# Patient Record
Sex: FEMALE | Race: WHITE | NOT HISPANIC OR LATINO | URBAN - METROPOLITAN AREA
[De-identification: names, ages, dates, MRNs, and addresses within clinical notes are randomized per-mention and may not be internally consistent; named-entity substitution may affect disease eponyms.]

---

## 2017-01-23 ENCOUNTER — OUTPATIENT (OUTPATIENT)
Dept: OUTPATIENT SERVICES | Facility: HOSPITAL | Age: 32
LOS: 1 days | End: 2017-01-23
Payer: MEDICAID

## 2017-01-23 VITALS
TEMPERATURE: 98 F | RESPIRATION RATE: 16 BRPM | SYSTOLIC BLOOD PRESSURE: 123 MMHG | WEIGHT: 177.91 LBS | HEIGHT: 60 IN | DIASTOLIC BLOOD PRESSURE: 71 MMHG

## 2017-01-23 DIAGNOSIS — E28.2 POLYCYSTIC OVARIAN SYNDROME: ICD-10-CM

## 2017-01-23 DIAGNOSIS — N80.0 ENDOMETRIOSIS OF UTERUS: ICD-10-CM

## 2017-01-23 DIAGNOSIS — N92.1 EXCESSIVE AND FREQUENT MENSTRUATION WITH IRREGULAR CYCLE: ICD-10-CM

## 2017-01-23 DIAGNOSIS — Z01.818 ENCOUNTER FOR OTHER PREPROCEDURAL EXAMINATION: ICD-10-CM

## 2017-01-23 DIAGNOSIS — Z98.51 TUBAL LIGATION STATUS: Chronic | ICD-10-CM

## 2017-01-23 LAB
ALBUMIN SERPL ELPH-MCNC: 3.5 G/DL — SIGNIFICANT CHANGE UP (ref 3.3–5)
ALP SERPL-CCNC: 71 U/L — SIGNIFICANT CHANGE UP (ref 40–120)
ALT FLD-CCNC: 85 U/L — HIGH (ref 12–78)
ANION GAP SERPL CALC-SCNC: 12 MMOL/L — SIGNIFICANT CHANGE UP (ref 5–17)
AST SERPL-CCNC: 66 U/L — HIGH (ref 15–37)
BILIRUB SERPL-MCNC: 0.3 MG/DL — SIGNIFICANT CHANGE UP (ref 0.2–1.2)
BUN SERPL-MCNC: 7 MG/DL — SIGNIFICANT CHANGE UP (ref 7–23)
CALCIUM SERPL-MCNC: 9.3 MG/DL — SIGNIFICANT CHANGE UP (ref 8.5–10.1)
CHLORIDE SERPL-SCNC: 103 MMOL/L — SIGNIFICANT CHANGE UP (ref 96–108)
CO2 SERPL-SCNC: 25 MMOL/L — SIGNIFICANT CHANGE UP (ref 22–31)
CREAT SERPL-MCNC: 0.64 MG/DL — SIGNIFICANT CHANGE UP (ref 0.5–1.3)
DIR ANTIGLOB POLYSPECIFIC INTERPRETATION: SIGNIFICANT CHANGE UP
GLUCOSE SERPL-MCNC: 90 MG/DL — SIGNIFICANT CHANGE UP (ref 70–99)
HBA1C BLD-MCNC: 5.7 % — HIGH (ref 4–5.6)
HCG SERPL-ACNC: <1 MIU/ML — SIGNIFICANT CHANGE UP
HCT VFR BLD CALC: 35.8 % — SIGNIFICANT CHANGE UP (ref 34.5–45)
HGB BLD-MCNC: 11.2 G/DL — LOW (ref 11.5–15.5)
MCHC RBC-ENTMCNC: 24.1 PG — LOW (ref 27–34)
MCHC RBC-ENTMCNC: 31.2 GM/DL — LOW (ref 32–36)
MCV RBC AUTO: 77.3 FL — LOW (ref 80–100)
PLATELET # BLD AUTO: 209 K/UL — SIGNIFICANT CHANGE UP (ref 150–400)
POTASSIUM SERPL-MCNC: 3.5 MMOL/L — SIGNIFICANT CHANGE UP (ref 3.5–5.3)
POTASSIUM SERPL-SCNC: 3.5 MMOL/L — SIGNIFICANT CHANGE UP (ref 3.5–5.3)
PROT SERPL-MCNC: 8.2 G/DL — SIGNIFICANT CHANGE UP (ref 6–8.3)
RBC # BLD: 4.63 M/UL — SIGNIFICANT CHANGE UP (ref 3.8–5.2)
RBC # FLD: 15.7 % — HIGH (ref 10.3–14.5)
SODIUM SERPL-SCNC: 140 MMOL/L — SIGNIFICANT CHANGE UP (ref 135–145)
WBC # BLD: 5.3 K/UL — SIGNIFICANT CHANGE UP (ref 3.8–10.5)
WBC # FLD AUTO: 5.3 K/UL — SIGNIFICANT CHANGE UP (ref 3.8–10.5)

## 2017-01-23 NOTE — H&P PST ADULT - HISTORY OF PRESENT ILLNESS
30 yo female scheduled for Suction Dilation & Curettage Hysteroscopy W/Novasure Endometrial Ablation with Dr Harrison Boone on 1/27/17.  Patient states she has had heavy continuous vaginal bleeding and cramping  since Thanksgiving.

## 2017-01-23 NOTE — H&P PST ADULT - ASSESSMENT
30 yo female scheduled for Suction Dilation & Curettage Hysteroscopy W/Novasure Endometrial Ablation with Dr Harrison Boone on 1/27/17.

## 2017-01-23 NOTE — H&P PST ADULT - PMH
Anxiety    Asthma  last episode 2 years ago, was hospitalized as a child  Endometriosis of uterus    Excessive and frequent menstruation with irregular cycle    Gestational diabetes    Polycystic ovarian syndrome

## 2017-01-23 NOTE — H&P PST ADULT - FAMILY HISTORY
Grandparent  Still living? Yes, Estimated age: 73  Family history of type 2 diabetes mellitus, Age at diagnosis: Age Unknown  Hypertension, Age at diagnosis: Age Unknown

## 2017-01-23 NOTE — H&P PST ADULT - NSANTHOSAYNRD_GEN_A_CORE
No. DANISHA screening performed.  STOP BANG Legend: 0-2 = LOW Risk; 3-4 = INTERMEDIATE Risk; 5-8 = HIGH Risk

## 2017-01-23 NOTE — H&P PST ADULT - PROBLEM SELECTOR PLAN 2
32 yo female scheduled for Suction Dilation & Curettage Hysteroscopy W/Novasure Endometrial Ablation with Dr Harrison Boone on 1/27/17.   Check labs CBC CMP Type and Screen HCG  No medical clearance  Hold evening dose of Metformin 1/26/17  Preop instructions were given  Patient verbalizes understanding of instructions

## 2017-01-26 PROCEDURE — 86880 COOMBS TEST DIRECT: CPT

## 2017-01-26 PROCEDURE — 86905 BLOOD TYPING RBC ANTIGENS: CPT

## 2017-01-26 PROCEDURE — 80053 COMPREHEN METABOLIC PANEL: CPT

## 2017-01-26 PROCEDURE — G0463: CPT

## 2017-01-26 PROCEDURE — 83036 HEMOGLOBIN GLYCOSYLATED A1C: CPT

## 2017-01-26 PROCEDURE — 85027 COMPLETE CBC AUTOMATED: CPT

## 2017-01-26 PROCEDURE — 86900 BLOOD TYPING SEROLOGIC ABO: CPT

## 2017-01-26 PROCEDURE — 86870 RBC ANTIBODY IDENTIFICATION: CPT

## 2017-01-26 PROCEDURE — 84702 CHORIONIC GONADOTROPIN TEST: CPT

## 2017-01-26 PROCEDURE — 86902 BLOOD TYPE ANTIGEN DONOR EA: CPT

## 2017-01-26 PROCEDURE — 86901 BLOOD TYPING SEROLOGIC RH(D): CPT

## 2017-01-26 PROCEDURE — 86850 RBC ANTIBODY SCREEN: CPT

## 2017-02-21 ENCOUNTER — OTHER (OUTPATIENT)
Age: 32
End: 2017-02-21

## 2017-02-21 ENCOUNTER — APPOINTMENT (OUTPATIENT)
Dept: GASTROENTEROLOGY | Facility: CLINIC | Age: 32
End: 2017-02-21

## 2017-02-21 VITALS
HEIGHT: 60 IN | SYSTOLIC BLOOD PRESSURE: 110 MMHG | WEIGHT: 175 LBS | DIASTOLIC BLOOD PRESSURE: 80 MMHG | BODY MASS INDEX: 34.36 KG/M2 | TEMPERATURE: 98.3 F

## 2017-02-21 DIAGNOSIS — N92.6 IRREGULAR MENSTRUATION, UNSPECIFIED: ICD-10-CM

## 2017-02-21 DIAGNOSIS — Z78.9 OTHER SPECIFIED HEALTH STATUS: ICD-10-CM

## 2017-02-21 DIAGNOSIS — K59.00 CONSTIPATION, UNSPECIFIED: ICD-10-CM

## 2017-02-21 DIAGNOSIS — E03.9 HYPOTHYROIDISM, UNSPECIFIED: ICD-10-CM

## 2017-02-21 DIAGNOSIS — D72.819 DECREASED WHITE BLOOD CELL COUNT, UNSPECIFIED: ICD-10-CM

## 2017-02-21 DIAGNOSIS — R74.0 NONSPECIFIC ELEVATION OF LEVELS OF TRANSAMINASE AND LACTIC ACID DEHYDROGENASE [LDH]: ICD-10-CM

## 2017-02-21 PROBLEM — Z00.00 ENCOUNTER FOR PREVENTIVE HEALTH EXAMINATION: Noted: 2017-02-21

## 2017-03-21 ENCOUNTER — APPOINTMENT (OUTPATIENT)
Dept: GASTROENTEROLOGY | Facility: CLINIC | Age: 32
End: 2017-03-21

## 2017-03-26 PROBLEM — J45.909 UNSPECIFIED ASTHMA, UNCOMPLICATED: Chronic | Status: ACTIVE | Noted: 2017-01-23

## 2017-03-26 PROBLEM — N92.1 EXCESSIVE AND FREQUENT MENSTRUATION WITH IRREGULAR CYCLE: Chronic | Status: ACTIVE | Noted: 2017-01-23

## 2017-03-26 PROBLEM — N80.0 ENDOMETRIOSIS OF UTERUS: Chronic | Status: ACTIVE | Noted: 2017-01-23

## 2017-03-26 PROBLEM — O24.419 GESTATIONAL DIABETES MELLITUS IN PREGNANCY, UNSPECIFIED CONTROL: Chronic | Status: ACTIVE | Noted: 2017-01-23

## 2017-03-28 ENCOUNTER — APPOINTMENT (OUTPATIENT)
Dept: GASTROENTEROLOGY | Facility: CLINIC | Age: 32
End: 2017-03-28

## 2017-03-28 VITALS
BODY MASS INDEX: 34.36 KG/M2 | RESPIRATION RATE: 16 BRPM | DIASTOLIC BLOOD PRESSURE: 80 MMHG | TEMPERATURE: 99.1 F | HEIGHT: 60 IN | WEIGHT: 175 LBS | SYSTOLIC BLOOD PRESSURE: 125 MMHG

## 2017-03-28 DIAGNOSIS — K58.1 IRRITABLE BOWEL SYNDROME WITH CONSTIPATION: ICD-10-CM

## 2017-03-28 DIAGNOSIS — Z09 ENCOUNTER FOR FOLLOW-UP EXAMINATION AFTER COMPLETED TREATMENT FOR CONDITIONS OTHER THAN MALIGNANT NEOPLASM: ICD-10-CM

## 2017-03-28 DIAGNOSIS — E66.9 OBESITY, UNSPECIFIED: ICD-10-CM

## 2017-03-28 DIAGNOSIS — Z87.42 PERSONAL HISTORY OF OTHER DISEASES OF THE FEMALE GENITAL TRACT: ICD-10-CM

## 2017-03-28 DIAGNOSIS — Z71.89 OTHER SPECIFIED COUNSELING: ICD-10-CM

## 2017-03-28 DIAGNOSIS — Z71.9 COUNSELING, UNSPECIFIED: ICD-10-CM

## 2017-03-28 DIAGNOSIS — K76.0 FATTY (CHANGE OF) LIVER, NOT ELSEWHERE CLASSIFIED: ICD-10-CM

## 2017-03-28 DIAGNOSIS — K21.9 GASTRO-ESOPHAGEAL REFLUX DISEASE W/OUT ESOPHAGITIS: ICD-10-CM

## 2017-03-28 DIAGNOSIS — D50.9 IRON DEFICIENCY ANEMIA, UNSPECIFIED: ICD-10-CM

## 2017-03-28 RX ORDER — CHLORHEXIDINE GLUCONATE 4 %
5 LIQUID (ML) TOPICAL
Refills: 0 | Status: ACTIVE | COMMUNITY

## 2017-03-28 RX ORDER — LACTULOSE 20 G/20G
20 POWDER, FOR SOLUTION ORAL DAILY
Qty: 30 | Refills: 3 | Status: DISCONTINUED | COMMUNITY
Start: 2017-02-21 | End: 2017-03-28

## 2017-03-28 RX ORDER — METFORMIN HYDROCHLORIDE 500 MG/1
500 TABLET, COATED ORAL
Refills: 0 | Status: ACTIVE | COMMUNITY

## 2017-03-28 RX ORDER — LACTULOSE 10 G/15ML
20 SOLUTION ORAL
Qty: 2000 | Refills: 2 | Status: ACTIVE | COMMUNITY
Start: 2017-02-21 | End: 1900-01-01

## 2017-03-28 RX ORDER — IRON POLYSACCHARIDE COMPLEX 150 MG
150 CAPSULE ORAL
Qty: 30 | Refills: 2 | Status: ACTIVE | COMMUNITY
Start: 2017-02-21

## 2017-03-28 RX ORDER — LEVOTHYROXINE SODIUM 25 UG/1
25 TABLET ORAL
Refills: 0 | Status: ACTIVE | COMMUNITY

## 2017-05-05 ENCOUNTER — OUTPATIENT (OUTPATIENT)
Dept: OUTPATIENT SERVICES | Facility: HOSPITAL | Age: 32
LOS: 1 days | End: 2017-05-05
Payer: MEDICAID

## 2017-05-05 VITALS
HEART RATE: 80 BPM | SYSTOLIC BLOOD PRESSURE: 104 MMHG | TEMPERATURE: 98 F | DIASTOLIC BLOOD PRESSURE: 53 MMHG | WEIGHT: 179.9 LBS | HEIGHT: 60 IN | RESPIRATION RATE: 16 BRPM

## 2017-05-05 DIAGNOSIS — N80.0 ENDOMETRIOSIS OF UTERUS: ICD-10-CM

## 2017-05-05 DIAGNOSIS — N92.1 EXCESSIVE AND FREQUENT MENSTRUATION WITH IRREGULAR CYCLE: ICD-10-CM

## 2017-05-05 DIAGNOSIS — Z01.818 ENCOUNTER FOR OTHER PREPROCEDURAL EXAMINATION: ICD-10-CM

## 2017-05-05 DIAGNOSIS — Z98.51 TUBAL LIGATION STATUS: Chronic | ICD-10-CM

## 2017-05-05 DIAGNOSIS — E28.1 ANDROGEN EXCESS: ICD-10-CM

## 2017-05-05 LAB — HCG SERPL-ACNC: <1 MIU/ML — SIGNIFICANT CHANGE UP

## 2017-05-05 NOTE — H&P PST ADULT - PROBLEM SELECTOR PLAN 4
Labs- CBC, CMP, PT/PTT , UA, C/S, HCG and EKG on chart from PCP office.   T&S and HCG pending   Seen and cleared by NP. CHRIST on chart.   Pre op instructions reviewed and given. Instructed to take her Synthroid am of surgery with sip of water. Hold Supplements. Continue with Iron. Hold Metformin night before and am of surgery.

## 2017-05-05 NOTE — H&P PST ADULT - PMH
Anxiety    Asthma  last episode 2 years ago, was hospitalized as a child  Elevated LFTs  Resolving; Was dx with a fatty liver after liver ultrasound  Endometriosis of uterus    Excessive and frequent menstruation with irregular cycle    Gestational diabetes    Irritable bowel syndrome with constipation    Polycystic ovarian syndrome

## 2017-05-05 NOTE — H&P PST ADULT - ASSESSMENT
32 yo female scheduled for Suction Dilation & Curettage Hysteroscopy W/Novasure Endometrial Ablation with Dr Harrison Boone on 1/27/17.

## 2017-05-05 NOTE — H&P PST ADULT - HISTORY OF PRESENT ILLNESS
30 yo female scheduled for Suction Dilation & Curettage Hysteroscopy W/Novasure Endometrial Ablation with Dr Harrison Boone on 1/27/2017. Patient states she has had heavy continuous vaginal bleeding and cramping  since Thanksgiving.      ADD- Previously cancelled secondary to elevated LFTs. Needed MC. After a full work -up, was diagnosed with a fatty liver and hypothyroidism. Seen and cleared by her PCP. Continues to report irregular vaginal bleeding with heavy flow. Denies pain today. H/O anemia. On Iron daily.

## 2017-05-11 PROCEDURE — 84702 CHORIONIC GONADOTROPIN TEST: CPT

## 2017-05-11 PROCEDURE — 86850 RBC ANTIBODY SCREEN: CPT

## 2017-05-11 PROCEDURE — 86870 RBC ANTIBODY IDENTIFICATION: CPT

## 2017-05-11 PROCEDURE — G0463: CPT

## 2017-05-11 PROCEDURE — 86900 BLOOD TYPING SEROLOGIC ABO: CPT

## 2017-05-11 PROCEDURE — 86902 BLOOD TYPE ANTIGEN DONOR EA: CPT

## 2017-05-11 PROCEDURE — 86901 BLOOD TYPING SEROLOGIC RH(D): CPT

## 2017-05-11 RX ORDER — ACETAMINOPHEN 500 MG
650 TABLET ORAL ONCE
Qty: 0 | Refills: 0 | Status: COMPLETED | OUTPATIENT
Start: 2017-05-12 | End: 2017-05-12

## 2017-05-11 RX ORDER — SODIUM CHLORIDE 9 MG/ML
1000 INJECTION, SOLUTION INTRAVENOUS
Qty: 0 | Refills: 0 | Status: DISCONTINUED | OUTPATIENT
Start: 2017-05-12 | End: 2017-05-12

## 2017-05-12 ENCOUNTER — RESULT REVIEW (OUTPATIENT)
Age: 32
End: 2017-05-12

## 2017-05-12 ENCOUNTER — TRANSCRIPTION ENCOUNTER (OUTPATIENT)
Age: 32
End: 2017-05-12

## 2017-05-12 ENCOUNTER — OUTPATIENT (OUTPATIENT)
Dept: OUTPATIENT SERVICES | Facility: HOSPITAL | Age: 32
LOS: 1 days | Discharge: HOME | End: 2017-05-12
Payer: MEDICAID

## 2017-05-12 VITALS
OXYGEN SATURATION: 100 % | TEMPERATURE: 98 F | RESPIRATION RATE: 13 BRPM | HEART RATE: 64 BPM | SYSTOLIC BLOOD PRESSURE: 124 MMHG | WEIGHT: 179.9 LBS | HEIGHT: 59 IN | DIASTOLIC BLOOD PRESSURE: 69 MMHG

## 2017-05-12 VITALS
DIASTOLIC BLOOD PRESSURE: 70 MMHG | OXYGEN SATURATION: 98 % | RESPIRATION RATE: 12 BRPM | HEART RATE: 68 BPM | SYSTOLIC BLOOD PRESSURE: 120 MMHG

## 2017-05-12 DIAGNOSIS — N80.0 ENDOMETRIOSIS OF UTERUS: ICD-10-CM

## 2017-05-12 DIAGNOSIS — E28.2 POLYCYSTIC OVARIAN SYNDROME: ICD-10-CM

## 2017-05-12 DIAGNOSIS — Z98.51 TUBAL LIGATION STATUS: Chronic | ICD-10-CM

## 2017-05-12 DIAGNOSIS — N92.1 EXCESSIVE AND FREQUENT MENSTRUATION WITH IRREGULAR CYCLE: ICD-10-CM

## 2017-05-12 PROCEDURE — 86922 COMPATIBILITY TEST ANTIGLOB: CPT

## 2017-05-12 PROCEDURE — 88305 TISSUE EXAM BY PATHOLOGIST: CPT

## 2017-05-12 PROCEDURE — 88305 TISSUE EXAM BY PATHOLOGIST: CPT | Mod: 26

## 2017-05-12 PROCEDURE — 86900 BLOOD TYPING SEROLOGIC ABO: CPT

## 2017-05-12 PROCEDURE — 58563 HYSTEROSCOPY ABLATION: CPT

## 2017-05-12 PROCEDURE — 86850 RBC ANTIBODY SCREEN: CPT

## 2017-05-12 PROCEDURE — 86901 BLOOD TYPING SEROLOGIC RH(D): CPT

## 2017-05-12 RX ORDER — LEVOTHYROXINE SODIUM 125 MCG
1 TABLET ORAL
Qty: 0 | Refills: 0 | COMMUNITY

## 2017-05-12 RX ORDER — LACTULOSE 10 G/15ML
0 SOLUTION ORAL
Qty: 0 | Refills: 0 | COMMUNITY

## 2017-05-12 RX ORDER — FERROUS SULFATE 325(65) MG
0 TABLET ORAL
Qty: 0 | Refills: 0 | COMMUNITY

## 2017-05-12 RX ORDER — OXYCODONE HYDROCHLORIDE 5 MG/1
10 TABLET ORAL EVERY 6 HOURS
Qty: 0 | Refills: 0 | Status: DISCONTINUED | OUTPATIENT
Start: 2017-05-12 | End: 2017-05-12

## 2017-05-12 RX ORDER — IBUPROFEN 200 MG
1 TABLET ORAL
Qty: 0 | Refills: 0 | COMMUNITY

## 2017-05-12 RX ORDER — HYDROMORPHONE HYDROCHLORIDE 2 MG/ML
0.5 INJECTION INTRAMUSCULAR; INTRAVENOUS; SUBCUTANEOUS
Qty: 0 | Refills: 0 | Status: DISCONTINUED | OUTPATIENT
Start: 2017-05-12 | End: 2017-05-12

## 2017-05-12 RX ORDER — METFORMIN HYDROCHLORIDE 850 MG/1
1 TABLET ORAL
Qty: 0 | Refills: 0 | COMMUNITY

## 2017-05-12 RX ORDER — SODIUM CHLORIDE 9 MG/ML
1000 INJECTION, SOLUTION INTRAVENOUS
Qty: 0 | Refills: 0 | Status: DISCONTINUED | OUTPATIENT
Start: 2017-05-12 | End: 2017-05-12

## 2017-05-12 RX ORDER — OXYCODONE HYDROCHLORIDE 5 MG/1
5 TABLET ORAL EVERY 4 HOURS
Qty: 0 | Refills: 0 | Status: DISCONTINUED | OUTPATIENT
Start: 2017-05-12 | End: 2017-05-12

## 2017-05-12 RX ORDER — ACETAMINOPHEN 500 MG
2 TABLET ORAL
Qty: 0 | Refills: 0 | COMMUNITY

## 2017-05-12 RX ORDER — FEXOFENADINE HCL 30 MG
0 TABLET ORAL
Qty: 0 | Refills: 0 | COMMUNITY

## 2017-05-12 RX ORDER — IPRATROPIUM/ALBUTEROL SULFATE 18-103MCG
0 AEROSOL WITH ADAPTER (GRAM) INHALATION
Qty: 0 | Refills: 0 | COMMUNITY

## 2017-05-12 RX ADMIN — Medication 650 MILLIGRAM(S): at 08:25

## 2017-05-12 RX ADMIN — SODIUM CHLORIDE 50 MILLILITER(S): 9 INJECTION, SOLUTION INTRAVENOUS at 08:23

## 2017-05-12 RX ADMIN — SODIUM CHLORIDE 80 MILLILITER(S): 9 INJECTION, SOLUTION INTRAVENOUS at 11:11

## 2017-05-12 RX ADMIN — HYDROMORPHONE HYDROCHLORIDE 0.5 MILLIGRAM(S): 2 INJECTION INTRAMUSCULAR; INTRAVENOUS; SUBCUTANEOUS at 11:11

## 2017-05-12 NOTE — ASU DISCHARGE PLAN (ADULT/PEDIATRIC). - MEDICATION SUMMARY - MEDICATIONS TO TAKE
I will START or STAY ON the medications listed below when I get home from the hospital:    Motrin 800 mg oral tablet  -- 1 tab(s) by mouth 3 times a day, As Needed  -- Indication: For Pain med    Tylenol 500 mg oral tablet  -- 2 tab(s) by mouth every 8 hours, As Needed  -- Indication: For Pain med    metFORMIN 500 mg oral tablet  -- 1 tab(s) by mouth 2 times a day  -- Indication: For home med    Allegra  --  by mouth , As Needed  -- Indication: For home med    albuterol-ipratropium  --  inhaled , As Needed  -- Indication: For home med    ferrous sulfate  --  by mouth once a day  -- Indication: For home med    lactulose  --  by mouth once a day, As Needed  -- Indication: For home med    Synthroid 25 mcg (0.025 mg) oral tablet  -- 1 tab(s) by mouth once a day  -- Indication: For home med

## 2017-05-12 NOTE — ASU DISCHARGE PLAN (ADULT/PEDIATRIC). - ACTIVITY LEVEL
no sports/gym/no intercourse/no exercise/no weight bearing/no douching/no tampons/no heavy lifting/nothing per vagina/no tub baths

## 2017-05-12 NOTE — BRIEF OPERATIVE NOTE - PROCEDURE
Hysteroscopy using resectoscope with radiofrequency ablation (RFA) of endometrium using NovaSure system  05/12/2017  D&C  Active  AYAD

## 2017-05-21 DIAGNOSIS — E28.2 POLYCYSTIC OVARIAN SYNDROME: ICD-10-CM

## 2017-05-21 DIAGNOSIS — J45.909 UNSPECIFIED ASTHMA, UNCOMPLICATED: ICD-10-CM

## 2017-05-21 DIAGNOSIS — N92.1 EXCESSIVE AND FREQUENT MENSTRUATION WITH IRREGULAR CYCLE: ICD-10-CM

## 2017-05-21 DIAGNOSIS — N84.0 POLYP OF CORPUS UTERI: ICD-10-CM

## 2019-09-02 PROBLEM — Z09 FOLLOW UP: Status: ACTIVE | Noted: 2017-03-28

## 2021-07-07 NOTE — H&P PST ADULT - WEIGHT IN LBS
[facility]  Progress Note - Rajesh Jernigan 57 1995, 27 y o  female MRN: [id number]  Unit/Bed#: [facility] 605-01 Encounter: [id number]  Primary Care Provider: Jossy Tejada DO   Date and time admitted to hospital: 7/6/2021 12:37 AM    Focal motor deficit  Assessment & Plan  Bilateral lower extremity weakness s/p fall down stairs/seizure  · Witnessed seizure and fall down a flight of steps  · On presentation complaining of bilateral LE numbness  · On Depakote maintenance  Imaging:  · MRI cervical spine wo 7/6/21:  Normal MRI of the cervical spine  · MRI thoracic spine wo 7/6/21: No cord signal abnormality  2   Multilevel disc herniation, pronounced at levels T6 through T9 indenting ventral thecal sac with mild canal narrowing  · MRI lumbar spine wo 7/6/21: Central disc protrusion at level L5-S1 contacting bilateral S1 nerve roots with suggested impingement of left S1 nerve root  Correlate for S1 radiculopathy  2   Disc bulge at levels L3-4 and L4-5 resulting in mild canal stenosis  · CT cervical spine 7/6/21: No cervical spine fracture or traumatic malalignment  Plan:  · Frequent neuro checks  · MRI imaging without etiology for LE weakness at presentation  Weakness continues to show some improvement  · Pain control and medical management per trauma team   · Cleared to work with PT/OT  · No spinal bracing required at this time  · Possible neuropraxia injury resulting in improving and transient weakness  · DVT ppx: SCDs, Lovenox  Neurosurgery will sign off and follow up clinically in 2 weeks to re-examine patient for continued neurological improvement  Please call with any questions or concerns  * Seizure ([address] )  Assessment & Plan  · On depakote at baseline  Last seizure several months ago prior to seizure at presentation         Subjective/Objective   Chief Complaint:  Doing little better    Subjective:  Patient states overall she continues to have some improvement  She states today she is doing better than she was yesterday  Upon entering the room patient was seen moving her legs in order to scratch her left foot  Per nurse Yolie Payton,  patient has been straight cathed x2 and is due to void shortly  May require Rivera catheter placement  Objective:  Lying in bed in no acute distress  I/O       07/05 0701 - 07/06 0700 07/06 0701 - 07/07 0700 07/07 0701 - 07/08 0700    P  O   360     IV Piggyback  50     Total Intake(mL/kg)  410 (3 6)     Urine (mL/kg/hr)  1475 (0 5)     Total Output  1475     Net  -1065                  Invasive Devices     Peripheral Intravenous Line            Peripheral IV 07/06/21 Left Antecubital 1 day                Physical Exam:  Vitals: Blood pressure 120/69, pulse 79, temperature 98 °F (36 7 °C), resp  rate 20, height 5' 7" (1 702 m), weight 115 kg (254 lb 10 1 oz), last menstrual period 06/19/2021, SpO2 95 %  ,Body mass index is 39 88 kg/m²  General appearance: alert, appears stated age, cooperative and no distress  Head: Normocephalic, without obvious abnormality, atraumatic  Eyes:  Tracking appropriately across room  Back: no kyphosis present, no tenderness to percussion or palpation  Lungs: non labored breathing  Heart: regular heart rate  Neurologic:   Mental status: Alert, oriented x3, thought content appropriate  Cranial nerves: grossly intact (Cranial nerves II-XII)  Sensory: normal to light touch in bilateral upper extremities  Slightly a generalized decrease in light touch on left compared to the right side lower extremity  Motor: moving all extremities antigravity  Patient able to lift to maintain legs off the bed at this time      Motor Findings  Strength: Right  Strength: Left    Deltoid  5/5  5/5    Biceps  5/5  5/5    Triceps  5/5  5/5    Wrist Extensors  5/5  5/5    Hand Intrinsics  5/5  5/5    Iliospoas  4-/5  4-/5    Quadriceps  4-/5  4/5    Tibialis anterior  4/5  4+/5    Gastroc soleus  4/5  4+/5 Reflexes: 2+ and symmetric  Coordination: finger to nose normal bilaterally, no drift bilaterally      Lab Results:  Results from last 7 days   Lab Units 07/06/21  0100 07/06/21  0055   WBC Thousand/uL  --  12 33*   HEMOGLOBIN g/dL  --  12 9   I STAT HEMOGLOBIN g/dl 12 9  --    HEMATOCRIT %  --  39 3   HEMATOCRIT, ISTAT % 38  --    PLATELETS Thousands/uL  --  342   NEUTROS PCT %  --  56   MONOS PCT %  --  6     Results from last 7 days   Lab Units 07/06/21  0100 07/06/21  0055   POTASSIUM mmol/L  --  4 0   CHLORIDE mmol/L  --  109*   CO2 mmol/L  --  26   CO2, I-STAT mmol/L 25  --    BUN mg/dL  --  12   CREATININE mg/dL  --  0 78   CALCIUM mg/dL  --  9 5   GLUCOSE, ISTAT mg/dl 96  --              Results from last 7 days   Lab Units 07/06/21  0055   INR  0 99   PTT seconds 32     No results found for: TROPONINT  ABG:No results found for: PHART, EET9OSW, PO2ART, RHG8LCL, X4KQRBXT, BEART, SOURCE    Imaging Studies: I have personally reviewed pertinent reports  and I have personally reviewed pertinent films in PACS  XR knee 4+ vw left injury    Result Date: 7/6/2021  Impression: No acute osseous abnormality  Workstation performed: [id number]     XR knee 4+ vw right injury    Result Date: 7/6/2021  Impression: No acute osseous abnormality  Workstation performed: [id number]     TRAUMA - CT head wo contrast    Result Date: 7/6/2021  Impression: No acute intracranial abnormality  Workstation performed: [id number]     TRAUMA - CT spine cervical wo contrast    Result Date: 7/6/2021  Impression: No cervical spine fracture or traumatic malalignment  Workstation performed: [id number]     MRI cervical spine wo contrast    Result Date: 7/6/2021  Impression: Normal MRI of the cervical spine  Workstation performed: [id number]     MRI thoracic spine wo contrast    Result Date: 7/6/2021  Impression: 1  No cord signal abnormality   2   Multilevel disc herniation, pronounced at levels T6 through T9 indenting ventral thecal sac with mild canal narrowing  Workstation performed: [id number]     MRI lumbar spine wo contrast    Result Date: 7/6/2021  Impression: 1  Central disc protrusion at level L5-S1 contacting bilateral S1 nerve roots with suggested impingement of left S1 nerve root  Correlate for S1 radiculopathy  2   Disc bulge at levels L3-4 and L4-5 resulting in mild canal stenosis  Workstation performed: [id number]     XR chest 1 view    Result Date: 7/7/2021  Impression: No acute cardiopulmonary disease within limitations of supine imaging  Workstation performed: [id number]     TRAUMA - CT chest abdomen pelvis w contrast    Result Date: 7/6/2021  Impression: No acute traumatic injury identified within the chest, abdomen, or pelvis  Workstation performed: [id number]     XR Trauma multiple ([facility] trauma bay ONLY)    Result Date: 7/6/2021  Impression: No acute cardiopulmonary disease within limitations of supine imaging  Workstation performed: [id number]       EKG, Pathology, and Other Studies: I have personally reviewed pertinent reports        VTE Pharmacologic Prophylaxis: Enoxaparin (Lovenox)    VTE Mechanical Prophylaxis: sequential compression device 179.8

## 2021-11-01 NOTE — H&P PST ADULT - NS PRO TALK SOMEONE YN
Last Office Visit   8/25/2021  Upcoming: Visit date not found    Last Refill  hydrochlorothiazide (HYDRODIURIL) 25 MG tablet 90 tablet 0 7/30/2021     Sig: TAKE 1 TABLET BY MOUTH EVERY DAY    Sent to pharmacy as: hydroCHLOROthiazide 25 MG Oral Tablet (HYDRODIURIL)    Class: Eprescribe    E-Prescribing Status: Receipt confirmed by pharmacy (7/30/2021 10:16 AM CDT)          FAILED PROTOCOL  Last BP:   BP Readings from Last 2 Encounters:   09/28/21 130/72   09/03/21 128/70     Abnormal Labs:   Creatinine:   CREATININE, SERUM (mg/dL)   Date Value   09/27/2021 1.9 (H)     Medication has been pended for provider review.   
no

## 2022-07-20 NOTE — H&P PST ADULT - NS PRO ABUSE SCREEN AFRAID ANYONE YN
Spoke with patient in detail. She had a ramp of 20 mins at 4.0 set on new machine we turned that down to ramp time of 5 mins.   Also advised to let machine run to clean out air   no

## 2024-02-22 NOTE — ASU DISCHARGE PLAN (ADULT/PEDIATRIC). - NOTIFY
Persistent Nausea and Vomiting/Bleeding that does not stop/GYN Fever>100.4/Fever greater than 101/Pain not relieved by Medications
N/A

## 2024-05-25 NOTE — H&P PST ADULT - PROBLEM SELECTOR PLAN 1
finger Scheduled for Suction Dilation & Curettage Hysteroscopy W/Novasure Endometrial Ablation with Dr Terry on 5/12/17.
